# Patient Record
Sex: MALE | Race: WHITE
[De-identification: names, ages, dates, MRNs, and addresses within clinical notes are randomized per-mention and may not be internally consistent; named-entity substitution may affect disease eponyms.]

---

## 2021-01-21 ENCOUNTER — HOSPITAL ENCOUNTER (EMERGENCY)
Age: 56
Discharge: ANOTHER HOSPITAL | End: 2021-01-21
Payer: COMMERCIAL

## 2021-01-21 ENCOUNTER — HOSPITAL ENCOUNTER (EMERGENCY)
Age: 56
Discharge: HOME | End: 2021-01-21
Payer: COMMERCIAL

## 2021-01-21 VITALS
TEMPERATURE: 98.42 F | OXYGEN SATURATION: 97 % | HEART RATE: 74 BPM | DIASTOLIC BLOOD PRESSURE: 108 MMHG | RESPIRATION RATE: 18 BRPM | SYSTOLIC BLOOD PRESSURE: 178 MMHG

## 2021-01-21 VITALS
HEART RATE: 67 BPM | DIASTOLIC BLOOD PRESSURE: 105 MMHG | OXYGEN SATURATION: 99 % | RESPIRATION RATE: 16 BRPM | SYSTOLIC BLOOD PRESSURE: 188 MMHG

## 2021-01-21 VITALS
DIASTOLIC BLOOD PRESSURE: 88 MMHG | OXYGEN SATURATION: 98 % | RESPIRATION RATE: 18 BRPM | TEMPERATURE: 97.2 F | HEART RATE: 69 BPM | SYSTOLIC BLOOD PRESSURE: 211 MMHG

## 2021-01-21 VITALS
SYSTOLIC BLOOD PRESSURE: 174 MMHG | OXYGEN SATURATION: 98 % | HEART RATE: 70 BPM | DIASTOLIC BLOOD PRESSURE: 95 MMHG | RESPIRATION RATE: 20 BRPM

## 2021-01-21 DIAGNOSIS — F17.200: ICD-10-CM

## 2021-01-21 DIAGNOSIS — F17.210: ICD-10-CM

## 2021-01-21 DIAGNOSIS — I10: Primary | ICD-10-CM

## 2021-01-21 DIAGNOSIS — R94.31: ICD-10-CM

## 2021-01-21 LAB
ADD MANUAL DIFF: NO
ANION GAP: 7 MMOL/L (ref 8–16)
BASIC METABOLIC AND HEMATOCRIT PNL BLD: 50.3 % (ref 42–52)
BLOOD UREA NITROGEN: 14 MG/DL (ref 9–20)
BUN SERPL-MCNC: 14 MG/DL (ref 9–20)
CALCIUM: 9.7 MG/DL (ref 8.4–10.2)
CARBON DIOXIDE: 25 MMOL/L (ref 22–30)
CHLORIDE SPEC-MCNC: 106 MMOL/L (ref 98–107)
CHLORIDE: 106 MMOL/L (ref 98–107)
CREAT CL PREDICTED SERPL C-G-VRATE: 102 ML/MIN
DELTA APTT PPP: 25.7 SECONDS (ref 22.3–36.8)
ESTIMATED CRCL CALCULATION: 102 ML/MIN
ESTIMATED GLOMERULAR FILT RATE: > 60
GFRSERPLBLD MDRD-ARVRAT: > 60 ML/MIN/{1.73_M2}
GLUCOSE SERPL-MCNC: 138 MG/DL (ref 75–110)
GLUCOSE: 138 MG/DL (ref 75–110)
HEMATOCRIT: 50.3 % (ref 42–52)
HEMOGLOBIN: 17 G/DL (ref 14–18)
HGB+HCT PNL BLD: 50.3 % (ref 42–52)
IMM GRANULOCYTES NFR BLD AUTO: 0.1 % (ref 0–0.5)
INR: 0.9
IPF CHARGE: (no result)
LYMPHOCYTES # SPEC AUTO: 3.63 K/MM3 (ref 0.9–3.2)
MCV RBC: 87 FL (ref 80–100)
MEAN CORPUSCULAR HEMOGLOBIN: 29.4 PG (ref 26–34)
MEAN CORPUSCULAR HGB CONC: 33.8 G/DL (ref 32–36)
MICRO URNS: YES
NUCLEATED RED BLOOD CELLS ABSOLUTE AUTO: 0 K/MM3 (ref 0–0.01)
NUCLEATED RED BLOOD CELLS PERC: 0 % (ref 0–0.2)
PARTIAL THROMBOPLASTIN TIME: 25.7 SECONDS (ref 22.3–36.8)
PH UR: 7 [PH] (ref 5–9)
PLATELET COUNT RESULT: 305 K/MM3 (ref 150–375)
POTASSIUM: 4.2 MMOL/L (ref 3.4–5)
PROT UR QL: (no result) MG/DL
PROTHROMBIN TIME: 12.4 SECONDS (ref 11.1–14.7)
RED BLOOD COUNT: 5.78 M/MM3 (ref 4.6–6.2)
SODIUM: 138 MMOL/L (ref 137–145)
SP GR UR: 1.02 (ref 1–1.03)
TROPONIN I: < 0.012 NG/ML (ref 0–0.03)
UROBILINOGEN UR QL: 2 MG/DL (ref ?–2)
WHITE BLOOD COUNT: 7.7 K/MM3 (ref 4.5–10)

## 2021-01-21 PROCEDURE — 99203 OFFICE O/P NEW LOW 30 MIN: CPT

## 2021-01-21 PROCEDURE — 99284 EMERGENCY DEPT VISIT MOD MDM: CPT

## 2021-01-21 PROCEDURE — 85610 PROTHROMBIN TIME: CPT

## 2021-01-21 PROCEDURE — 85730 THROMBOPLASTIN TIME PARTIAL: CPT

## 2021-01-21 PROCEDURE — 93005 ELECTROCARDIOGRAM TRACING: CPT

## 2021-01-21 PROCEDURE — 71046 X-RAY EXAM CHEST 2 VIEWS: CPT

## 2021-01-21 PROCEDURE — 81001 URINALYSIS AUTO W/SCOPE: CPT

## 2021-01-21 PROCEDURE — 80048 BASIC METABOLIC PNL TOTAL CA: CPT

## 2021-01-21 PROCEDURE — G0463 HOSPITAL OUTPT CLINIC VISIT: HCPCS

## 2021-01-21 PROCEDURE — A9270 NON-COVERED ITEM OR SERVICE: HCPCS

## 2021-01-21 PROCEDURE — 84484 ASSAY OF TROPONIN QUANT: CPT

## 2021-01-21 PROCEDURE — 36415 COLL VENOUS BLD VENIPUNCTURE: CPT

## 2021-01-21 PROCEDURE — 85025 COMPLETE CBC W/AUTO DIFF WBC: CPT

## 2022-02-22 ENCOUNTER — HOSPITAL ENCOUNTER (EMERGENCY)
Age: 57
Discharge: HOME | End: 2022-02-23
Attending: SPECIALIST
Payer: COMMERCIAL

## 2022-02-22 DIAGNOSIS — S61.411A LACERATION OF PALM, RIGHT, INITIAL ENCOUNTER: Primary | ICD-10-CM

## 2022-02-22 DIAGNOSIS — Z78.9 ALCOHOL USE: ICD-10-CM

## 2022-02-22 PROCEDURE — 99284 EMERGENCY DEPT VISIT MOD MDM: CPT

## 2022-02-23 VITALS
SYSTOLIC BLOOD PRESSURE: 177 MMHG | WEIGHT: 200 LBS | BODY MASS INDEX: 28 KG/M2 | DIASTOLIC BLOOD PRESSURE: 109 MMHG | HEART RATE: 132 BPM | HEIGHT: 71 IN | OXYGEN SATURATION: 96 % | RESPIRATION RATE: 18 BRPM

## 2022-02-23 PROCEDURE — 90715 TDAP VACCINE 7 YRS/> IM: CPT | Performed by: SPECIALIST

## 2022-02-23 PROCEDURE — 90471 IMMUNIZATION ADMIN: CPT | Performed by: SPECIALIST

## 2022-02-23 PROCEDURE — 6360000002 HC RX W HCPCS: Performed by: SPECIALIST

## 2022-02-23 PROCEDURE — 6370000000 HC RX 637 (ALT 250 FOR IP): Performed by: SPECIALIST

## 2022-02-23 RX ORDER — CEPHALEXIN 500 MG/1
500 CAPSULE ORAL 4 TIMES DAILY
Qty: 28 CAPSULE | Refills: 0 | Status: SHIPPED | OUTPATIENT
Start: 2022-02-23 | End: 2022-03-02

## 2022-02-23 RX ORDER — CEPHALEXIN 250 MG/1
500 CAPSULE ORAL ONCE
Status: COMPLETED | OUTPATIENT
Start: 2022-02-23 | End: 2022-02-23

## 2022-02-23 RX ADMIN — TETANUS TOXOID, REDUCED DIPHTHERIA TOXOID AND ACELLULAR PERTUSSIS VACCINE, ADSORBED 0.5 ML: 5; 2.5; 8; 8; 2.5 SUSPENSION INTRAMUSCULAR at 01:28

## 2022-02-23 RX ADMIN — CEPHALEXIN 500 MG: 250 CAPSULE ORAL at 01:28

## 2022-02-23 NOTE — ED PROVIDER NOTES
[address]      Pt Name: Kate Cornejo  MRN: [id number]  [place] 1965  Date of evaluation: 2/23/22      CHIEF COMPLAINT       Chief Complaint   Patient presents with    Alcohol Intoxication     Patient arrived to triage per [place] police in handcuffs. Patient loud and verbally abuse and admit to alcohol tonight. cut to the palm of his right hand.  Hand Injury         HISTORY OF PRESENT ILLNESS    Kate Cornejo is a 49 y.o. male who presents to the emergency department brought in by [place] police in handcuffs. Patient apparently was at a bar, drinking alcoholic beverages and became disorderly. Police was called and he apparently fell sustaining superficial abrasion/laceration in the right palm. No history of head injury or loss of consciousness and patient denies any headache or neck pain. Last tetanus injection is unknown. He denies any tingling, numbness or weakness distally. He is right-hand dominant and denies any previous injuries to the right hand. Patient was loud and verbally abusive upon arrival and admits to drinking alcoholic beverages. During the ED stay, patient became calm and cooperative and denies any other symptoms. He denies any illicit drug use or abuse. REVIEW OF SYSTEMS       Review of Systems    All systems reviewed and negative unless noted in HPI. The patient denies fever or constitutional symptoms. Denies any sore throat or rhinorrhea. Denies any neck pain or stiffness. Denies chest pain or shortness of breath. No nausea,  vomiting or diarrhea. Denies any dysuria. Denies urinary frequency or hematuria. Denies musculoskeletal injury or pain. Denies any weakness, numbness or focal neurologic deficit. No easy bruising or bleeding. Denies any polyuria, polydypsia       PAST MEDICAL HISTORY    has a past medical history of Hypertension.     SURGICAL HISTORY      has no past surgical history on file.    CURRENT MEDICATIONS     There are no discharge medications for this patient. ALLERGIES     has No Known Allergies. FAMILY HISTORY     has no family status information on file. family history is not on file. SOCIAL HISTORY      reports current alcohol use. PHYSICAL EXAM     INITIAL VITALS:  height is 5' 11\" (1.803 m) and weight is 90.7 kg (200 lb). His blood pressure is 177/109 (abnormal) and his pulse is 132. His respiration is 18 and oxygen saturation is 96%. Physical Exam  Vitals and nursing note reviewed. Constitutional:       Appearance: He is well-developed. HENT:      Head: Normocephalic and atraumatic. Nose: Nose normal.   Eyes:      Extraocular Movements: Extraocular movements intact. Pupils: Pupils are equal, round, and reactive to light. Cardiovascular:      Rate and Rhythm: Normal rate and regular rhythm. Heart sounds: Normal heart sounds. No murmur heard. Pulmonary:      Effort: Pulmonary effort is normal. No respiratory distress. Breath sounds: Normal breath sounds. Abdominal:      General: Bowel sounds are normal. There is no distension. Palpations: Abdomen is soft. Tenderness: There is no abdominal tenderness. Musculoskeletal:      Cervical back: Normal range of motion and neck supple. Skin:     General: Skin is warm and dry. Findings: Abrasion and laceration present. Comments: Patient has superficial abrasion approximately 1 cm in length and small superficial laceration approximately 0.5 cm length horizontal in direction in the right palm. No evidence of foreign body, tendon or nerve involvement. The edges of the laceration are well opposed. Neurological:      General: No focal deficit present. Mental Status: He is alert and oriented to person, place, and time.            DIFFERENTIAL DIAGNOSIS/ MDM:     Abrasions and laceration right palm, alcohol intoxication    DIAGNOSTIC RESULTS     EKG: All EKG's are interpreted by the Emergency Department Physician who either signs or Co-signs this chart in the absence of a cardiologist.    None obtained    RADIOLOGY:   Interpretation per the Radiologist below, if available at the time of this note:    No results found. LABS:  No results found for this visit on 02/22/22. EMERGENCY DEPARTMENT COURSE:   Vitals:    Vitals:    02/23/22 0007   BP: (!) 177/109   Pulse: 132   Resp: 18   SpO2: 96%   Weight: 90.7 kg (200 lb)   Height: 5' 11\" (1.803 m)     -------------------------  BP: (!) 177/109, Temp: (S)  (refused), Pulse: 132, Resp: 18    Orders Placed This Encounter   Medications    Tetanus-Diphth-Acell Pertussis (BOOSTRIX) injection 0.5 mL    cephALEXin (KEFLEX) capsule 500 mg     Order Specific Question:   Antimicrobial Indications     Answer: Other     Order Specific Question:   Other Abx Indication     Answer:   Hand laceration    cephALEXin (KEFLEX) 500 MG capsule     Sig: Take 1 capsule by mouth 4 times daily for 7 days     Dispense:  28 capsule     Refill:  0         During the emergency department course, patient was given Boostrix 0.5 mill intramuscularly and Keflex 500 mg orally. Abrasion and laceration were cleansed and suture repair was advised on the laceration but patient prefers the abrasion and laceration to be clean stand antibiotic ointment dressing to be applied. He was given wound care instructions and discharged in the custody of police with instructions to observe closely due to alcohol intoxication, Keflex 4 times daily for 7 days, watch carefully for any signs of infection, follow-up with PCP for wound recheck, return if worse. CONSULTS:  None    PROCEDURES:  None    FINAL IMPRESSION      1. Laceration of palm, right, initial encounter    2.  Alcohol use          DISPOSITION/PLAN       PATIENT REFERRED TO:  Call 419-same-day for follow up    Call in 2 days  For wound re-check    [facility] ED  [address]